# Patient Record
Sex: FEMALE | Race: BLACK OR AFRICAN AMERICAN | Employment: UNEMPLOYED | URBAN - METROPOLITAN AREA
[De-identification: names, ages, dates, MRNs, and addresses within clinical notes are randomized per-mention and may not be internally consistent; named-entity substitution may affect disease eponyms.]

---

## 2019-06-26 ENCOUNTER — HOSPITAL ENCOUNTER (OUTPATIENT)
Dept: NON INVASIVE DIAGNOSTICS | Age: 59
Discharge: HOME | End: 2019-06-26

## 2019-06-26 LAB
LEFT VENTRICULAR EJECTION FRACTION HIGH VALUE: 60 %
LEFT VENTRICULAR EJECTION FRACTION MODE: NORMAL
LV EF: 55 %
LV EF: 58 %
LVEF MODALITY: NORMAL

## 2019-06-26 PROCEDURE — 93306 TTE W/DOPPLER COMPLETE: CPT

## 2019-07-23 PROBLEM — R07.9 CHEST PAIN: Status: ACTIVE | Noted: 2019-07-23

## 2019-07-23 NOTE — PROGRESS NOTES
2019    PATIENT: Cookie Mckinley  : 1960    Primary Care Provider:   Nia Chavez  F:[phone]  L:[phone]      Reason for evaluation:   Chief Complaint   Patient presents with    Chest Pain    Established New Doctor    Cough    Hypertension       History of present illness:   Ms. Cookie Mckinley is a 59 y.o. female patient here in initial cardiovascular evaluation for concerns of palpitations and dyspnea on exertion. Antonio Skinner is actually here on a visa for 6 months as her son is in the [place]. She returns to [facility] on  and says that she has had some previous care and diagnostics in [place]. She says that they told her her heart was swelling and enlarged and placed her on one blood pressure medication as well as aspirin twice weekly (stating more than that cause nosebleeds). She does not appear to have any other previous diagnoses with the help of an [place] speaking . She says that actually the most bothersome thing has been a persistent nonproductive cough. Her lisinopril was switched to amlodipine with maintained pressure control. The cough has persisted day and night. She says that she has not tried antacids despite historically spicy food in her culture. She also reports that she works out twice weekly on a stationary bike over the years and feels as if she is having some shortness of breath with recent exercises. She says that at times different emotions can cause palpitations as \"all women wake up stressed in the  culture\". He has no family history of heart disease and is a never smoker. Medical History:      Diagnosis Date    Hypertension     Malaria        Surgical History:  History reviewed. No pertinent surgical history.     Social History:  Social History     Socioeconomic History    Marital status:      Spouse name: Not on file    Number of children: Not on file    Years obtained and negative except as mentioned in HPI    Physical Examination:    /76 (Site: Right Upper Arm, Position: Sitting, Cuff Size: Large Adult)   Pulse 76   Wt 177 lb 4.8 oz (80.4 kg)   SpO2 93%   Wt Readings from Last 3 Encounters:   19 177 lb 4.8 oz (80.4 kg)     Vitals:    19 1424   BP: 116/76   Pulse: 76   SpO2: 93%       · GENERAL: Well developed, well nourished, no acute distress  · NEUROLOGICAL: Alert and oriented x3  · PSYCH: Normal mood and affect   · SKIN: Warm and dry  · HEENT: Normocephalic, atraumatic, Sclera non-icteric, mucous membranes moist  · NECK: supple, JVP normal  · CARDIAC: Normal PMI, regular rate and rhythm, normal S1S2, no murmur, rub, or gallop  · RESPIRATORY: Normal respiratory effort, clear to auscultation bilaterally  · EXTREMITIES: no edema or clubbing, +2 pulses bilaterally   · MUSCULOSKELETAL: No joint swelling or tenderness, no chest wall tenderness  · GASTROINTESTINAL: normal bowel sounds, soft, non-tender      Labs:  Lab Review   Hospital Outpatient Visit on 2019   Component Date Value    Left Ventricular Ejectio* 2019 58     LVEF MODALITY 2019 ECHO     Left Ventricular Ejectio* 2019 55     LEFT VENTRICULAR EJECTIO* 2019 Echo     Left Ventricular Ejectio* 2019 60          Imaging:  I have reviewed the below testing personally:    EK19  NSR  NSST changes     ECHO:   19  Summary   -Normal left ventricle size, wall thickness, and systolic function with an   estimated ejection fraction of 55-60%. No regional wall motion abnormalities   are seen. Normal diastolic function. E/e\"=6.75.   -Mild mitral regurgitation.   -Mild tricuspid regurgitation.   -Estimated pulmonary artery systolic pressure is normal at 36 mmHg assuming   a right atrial pressure of 3 mmHg. Impression/Recommendations    Ms. Alla Torres is a 59 y.o. female patient with:    1. Chest pain, atypical with palpitations   2. and medical decision making performed by me. An electronic signature was used to authenticate this note.

## 2019-07-24 ENCOUNTER — OFFICE VISIT (OUTPATIENT)
Dept: CARDIOLOGY CLINIC | Age: 59
End: 2019-07-24

## 2019-07-24 VITALS
OXYGEN SATURATION: 93 % | SYSTOLIC BLOOD PRESSURE: 116 MMHG | DIASTOLIC BLOOD PRESSURE: 76 MMHG | HEART RATE: 76 BPM | WEIGHT: 177.3 LBS

## 2019-07-24 DIAGNOSIS — I10 HYPERTENSION, UNSPECIFIED TYPE: ICD-10-CM

## 2019-07-24 DIAGNOSIS — R07.9 CHEST PAIN, UNSPECIFIED TYPE: Primary | ICD-10-CM

## 2019-07-24 PROCEDURE — 99204 OFFICE O/P NEW MOD 45 MIN: CPT | Performed by: INTERNAL MEDICINE

## 2019-07-24 PROCEDURE — 93000 ELECTROCARDIOGRAM COMPLETE: CPT | Performed by: INTERNAL MEDICINE

## 2019-07-24 RX ORDER — AMLODIPINE BESYLATE 5 MG/1
5 TABLET ORAL DAILY
COMMUNITY

## 2019-07-24 RX ORDER — PANTOPRAZOLE SODIUM 40 MG/1
40 TABLET, DELAYED RELEASE ORAL
Qty: 90 TABLET | Refills: 1 | Status: SHIPPED | OUTPATIENT
Start: 2019-07-24